# Patient Record
Sex: MALE | Race: WHITE | Employment: OTHER | URBAN - METROPOLITAN AREA
[De-identification: names, ages, dates, MRNs, and addresses within clinical notes are randomized per-mention and may not be internally consistent; named-entity substitution may affect disease eponyms.]

---

## 2018-08-01 ENCOUNTER — TELEPHONE (OUTPATIENT)
Dept: ORTHOPEDIC SURGERY | Age: 53
End: 2018-08-01

## 2018-08-01 NOTE — TELEPHONE ENCOUNTER
GAVE TO CANELO ALFONSO TO SCAN IN [facility];  MEDICAL RECORDS FOR [employer] FROM 2/24/2011 TO PRESENT.

## 2018-08-01 NOTE — TELEPHONE ENCOUNTER
[facility]ED [facility] MEDICAL RECORDS FOR 2/24/11 TO PRESENT INTO [facility] FOR [employer]

## 2020-11-12 RX ORDER — GABAPENTIN 100 MG/1
100 CAPSULE ORAL 3 TIMES DAILY
COMMUNITY

## 2020-11-12 NOTE — PROGRESS NOTES
[address]  time_11/18/2020 1200___________        Surgery time_____1300_______    Take the following medications with a sip of water:    Do not eat or drink anything after 12:00 midnight prior to your surgery. This includes water chewing gum, mints and ice chips. You may brush your teeth and gargle the morning of your surgery, but do not swallow the water     Please see your family doctor/pediatrician for a history and physical and/or concerning medications. Bring any test results/reports from your physicians office. If you are under the care of a heart doctor or specialist doctor, please be aware that you may be asked to them for clearance    You may be asked to stop blood thinners such as Coumadin, Plavix, Fragmin, Lovenox, etc., or any anti-inflammatories such as:  Aspirin, Ibuprofen, Advil, Naproxen prior to your surgery. We also ask that you stop any OTC medications such as fish oil, vitamin E, glucosamine, garlic, Multivitamins, COQ 10, etc.    We ask that you do not smoke 24 hours prior to surgery  We ask that you do not  drink any alcoholic beverages 24 hours prior to surgery     You must make arrangements for a responsible adult to take you home after your surgery. For your safety you will not be allowed to leave alone or drive yourself home. Your surgery will be cancelled if you do not have a ride home. Also for your safety, it is strongly suggested that someone stay with you the first 24 hours after your surgery. A parent or legal guardian must accompany a child scheduled for surgery and plan to stay at the hospital until the child is discharged. Please do not bring other children with you. For your comfort, please wear simple loose fitting clothing to the hospital.  Please do not bring valuables.     Do not wear any make-up or nail polish on your fingers or toes      For your safety, please do not wear any jewelry or body piercing's on the day of surgery. All jewelry must be removed. If you have dentures, they will be removed before going to operating room. For your convenience, we will provide you with a container. If you wear contact lenses or glasses, they will be removed, please bring a case for them. If you have a living will and a durable power of  for healthcare, please bring in a copy. As part of our patient safety program to minimize surgical site infections, we ask you to do the following:    · Please notify your surgeon if you develop any illness between         now and the  day of your surgery. · This includes a cough, cold, fever, sore throat, nausea,         or vomiting, and diarrhea, etc.  ·  Please notify your surgeon if you experience dizziness, shortness         of breath or blurred vision between now and the time of your surgery. Do not shave your operative site 96 hours prior to surgery. For face and neck surgery, men may use an electric razor 48 hours   prior to surgery. You may shower the night before surgery or the morning of   your surgery with an antibacterial soap. You will need to bring a photo ID and insurance card    [facility] has an onsite pharmacy, would you like to utilize our pharmacy     If you will be staying overnight and use a C-pap machine, please bring   your C-pap to hospital     Our goal is to provide you with excellent care, therefore, visitors will be limited to two(2) in the room at a time so that we may focus on providing this care for you. Please contact pre-admission testing if you have any further questions. [facility] phone number:  [phone]  Please note these are generalized instructions for all surgical cases, you may be provided with more specific instructions according to your surgery.

## 2020-11-17 ENCOUNTER — ANESTHESIA EVENT (OUTPATIENT)
Dept: ENDOSCOPY | Age: 55
End: 2020-11-17
Payer: COMMERCIAL

## 2020-11-18 ENCOUNTER — ANESTHESIA (OUTPATIENT)
Dept: ENDOSCOPY | Age: 55
End: 2020-11-18
Payer: COMMERCIAL

## 2020-11-18 ENCOUNTER — HOSPITAL ENCOUNTER (OUTPATIENT)
Age: 55
Setting detail: OUTPATIENT SURGERY
Discharge: HOME | End: 2020-11-18
Attending: INTERNAL MEDICINE | Admitting: INTERNAL MEDICINE
Payer: COMMERCIAL

## 2020-11-18 VITALS
OXYGEN SATURATION: 98 % | HEART RATE: 86 BPM | RESPIRATION RATE: 16 BRPM | TEMPERATURE: 98.1 F | SYSTOLIC BLOOD PRESSURE: 134 MMHG | HEIGHT: 71 IN | WEIGHT: 243.13 LBS | BODY MASS INDEX: 34.04 KG/M2 | DIASTOLIC BLOOD PRESSURE: 92 MMHG

## 2020-11-18 VITALS — OXYGEN SATURATION: 97 % | DIASTOLIC BLOOD PRESSURE: 74 MMHG | SYSTOLIC BLOOD PRESSURE: 123 MMHG

## 2020-11-18 PROCEDURE — 2580000003 HC RX 258: Performed by: ANESTHESIOLOGY

## 2020-11-18 PROCEDURE — 3609010600 HC COLONOSCOPY POLYPECTOMY SNARE/COLD BIOPSY: Performed by: INTERNAL MEDICINE

## 2020-11-18 PROCEDURE — 2709999900 HC NON-CHARGEABLE SUPPLY: Performed by: INTERNAL MEDICINE

## 2020-11-18 PROCEDURE — 88305 TISSUE EXAM BY PATHOLOGIST: CPT

## 2020-11-18 PROCEDURE — 2500000003 HC RX 250 WO HCPCS: Performed by: NURSE ANESTHETIST, CERTIFIED REGISTERED

## 2020-11-18 PROCEDURE — 7100000010 HC PHASE II RECOVERY - FIRST 15 MIN: Performed by: INTERNAL MEDICINE

## 2020-11-18 PROCEDURE — 3700000000 HC ANESTHESIA ATTENDED CARE: Performed by: INTERNAL MEDICINE

## 2020-11-18 PROCEDURE — 3700000001 HC ADD 15 MINUTES (ANESTHESIA): Performed by: INTERNAL MEDICINE

## 2020-11-18 PROCEDURE — 6360000002 HC RX W HCPCS: Performed by: NURSE ANESTHETIST, CERTIFIED REGISTERED

## 2020-11-18 PROCEDURE — 7100000011 HC PHASE II RECOVERY - ADDTL 15 MIN: Performed by: INTERNAL MEDICINE

## 2020-11-18 RX ORDER — SODIUM CHLORIDE 0.9 % (FLUSH) 0.9 %
10 SYRINGE (ML) INJECTION PRN
Status: DISCONTINUED | OUTPATIENT
Start: 2020-11-18 | End: 2020-11-18 | Stop reason: HOSPADM

## 2020-11-18 RX ORDER — SODIUM CHLORIDE 9 MG/ML
INJECTION, SOLUTION INTRAVENOUS CONTINUOUS
Status: DISCONTINUED | OUTPATIENT
Start: 2020-11-18 | End: 2020-11-18 | Stop reason: HOSPADM

## 2020-11-18 RX ORDER — SODIUM CHLORIDE 0.9 % (FLUSH) 0.9 %
10 SYRINGE (ML) INJECTION EVERY 12 HOURS SCHEDULED
Status: DISCONTINUED | OUTPATIENT
Start: 2020-11-18 | End: 2020-11-18 | Stop reason: HOSPADM

## 2020-11-18 RX ORDER — LIDOCAINE HYDROCHLORIDE 20 MG/ML
INJECTION, SOLUTION EPIDURAL; INFILTRATION; INTRACAUDAL; PERINEURAL PRN
Status: DISCONTINUED | OUTPATIENT
Start: 2020-11-18 | End: 2020-11-18 | Stop reason: SDUPTHER

## 2020-11-18 RX ORDER — PROPOFOL 10 MG/ML
INJECTION, EMULSION INTRAVENOUS PRN
Status: DISCONTINUED | OUTPATIENT
Start: 2020-11-18 | End: 2020-11-18 | Stop reason: SDUPTHER

## 2020-11-18 RX ADMIN — LIDOCAINE HYDROCHLORIDE 80 MG: 20 INJECTION, SOLUTION EPIDURAL; INFILTRATION; INTRACAUDAL; PERINEURAL at 13:14

## 2020-11-18 RX ADMIN — PROPOFOL 50 MG: 10 INJECTION, EMULSION INTRAVENOUS at 13:21

## 2020-11-18 RX ADMIN — SODIUM CHLORIDE: 9 INJECTION, SOLUTION INTRAVENOUS at 12:48

## 2020-11-18 RX ADMIN — PROPOFOL 100 MG: 10 INJECTION, EMULSION INTRAVENOUS at 13:16

## 2020-11-18 RX ADMIN — PROPOFOL 100 MG: 10 INJECTION, EMULSION INTRAVENOUS at 13:14

## 2020-11-18 ASSESSMENT — PAIN - FUNCTIONAL ASSESSMENT: PAIN_FUNCTIONAL_ASSESSMENT: 0-10

## 2020-11-18 ASSESSMENT — PAIN SCALES - GENERAL
PAINLEVEL_OUTOF10: 0

## 2020-11-18 ASSESSMENT — ENCOUNTER SYMPTOMS: SHORTNESS OF BREATH: 0

## 2020-11-18 NOTE — ANESTHESIA POSTPROCEDURE EVALUATION
Department of Anesthesiology  Postprocedure Note    Patient: Rama Lan  MRN: [id number]  YOB: 1965  Date of evaluation: 11/18/2020  Time:  1:49 PM     Procedure Summary     Date:  11/18/20 Room / Location:  [address] [place] [address]    Anesthesia Start:  3153 Anesthesia Stop:  9797    Procedure:  COLONOSCOPY POLYPECTOMY SNARE/COLD BIOPSY (N/A ) Diagnosis:       Family history of polyps in the colon      (FAMILY HISTORY OF POLYPS (MOM))    Surgeon:  Farrukh Haider MD Responsible Provider:  Rickey Ghosh MD    Anesthesia Type:  MAC ASA Status:  2          Anesthesia Type: MAC    Julianne Phase I: Julianne Score: 10    Julianne Phase II: Julianne Score: 9    Last vitals: Reviewed and per EMR flowsheets.        Anesthesia Post Evaluation    Patient location during evaluation: PACU  Level of consciousness: awake and alert  Airway patency: patent  Nausea & Vomiting: no nausea and no vomiting  Complications: no  Cardiovascular status: blood pressure returned to baseline  Respiratory status: acceptable  Hydration status: euvolemic  Comments: Postoperative Anesthesia Note    Name:    Rama Lan  MRN:      [id number]    Patient Vitals in the past 12 hrs:  11/18/20 1345, BP:(!) 134/92, Pulse:86, Resp:16, SpO2:98 %  11/18/20 1337, BP:(!) 92/46, Pulse:82, Resp:16, SpO2:97 %  11/18/20 1327, BP:(!) 116/58, Temp:98.1 °F (36.7 °C), Temp src:Temporal, Pulse:88, Resp:16, SpO2:97 %  11/18/20 1222, BP:(!) 159/96, Temp:98 °F (36.7 °C), Temp src:Temporal, Pulse:102, Resp:18, SpO2:97 %, Height:5' 11\" (1.803 m), Weight:243 lb 2 oz (110.3 kg)     LABS:    CBC  Lab Results       Component                Value               Date/Time                  WBC                      5.8                 08/18/2015 11:45 AM        HGB                      12.1 (L)            08/18/2015 11:45 AM        HCT                      38.6 (L)            08/18/2015 11:45 AM        PLT                      258 08/18/2015 11:45 AM   RENAL  Lab Results       Component                Value               Date/Time                  NA                       141                 07/09/2013 03:40 PM        K                        4.0                 07/09/2013 03:40 PM        CL                       102                 07/09/2013 03:40 PM        CO2                      29                  07/09/2013 03:40 PM        BUN                      17                  07/09/2013 03:40 PM        CREATININE               1.1                 07/09/2013 03:40 PM        GLUCOSE                  97                  07/09/2013 03:40 PM   COAGS  No results found for: PROTIME, INR, APTT    Intake & Output:  @24HRIO@    Nausea & Vomiting:  No    Level of Consciousness:  Awake    Pain Assessment:  Adequate analgesia    Anesthesia Complications:  No apparent anesthetic complications    SUMMARY      Vital signs stable  OK to discharge from Stage I post anesthesia care.   Care transferred from Anesthesiology department on discharge from perioperative area

## 2020-11-18 NOTE — ANESTHESIA PRE PROCEDURE
[facility] Department of Anesthesiology  Pre-Anesthesia Evaluation/Consultation       Name:  Landon Reich  : 1965  Age:  50 y.o. MRN:  [id number]  Date: 2020           Surgeon: Surgeon(s):  Keyonna Winn MD    Procedure: Procedure(s):  COLONOSCOPY DIAGNOSTIC     Allergies   Allergen Reactions    Reglan [Metoclopramide Hcl]      There is no problem list on file for this patient. Past Medical History:   Diagnosis Date    Hyperlipidemia     Hypertension      Past Surgical History:   Procedure Laterality Date    ANTERIOR CRUCIATE LIGAMENT REPAIR      right knee    BACK SURGERY      CERVICAL SPINE SURGERY      COLONOSCOPY       Social History     Tobacco Use    Smoking status: Never Smoker    Smokeless tobacco: Never Used   Substance Use Topics    Alcohol use: Not Currently    Drug use: Never     Medications  No current facility-administered medications on file prior to encounter. Current Outpatient Medications on File Prior to Encounter   Medication Sig Dispense Refill    gabapentin (NEURONTIN) 100 MG capsule Take 100 mg by mouth 3 times daily.  ibuprofen (ADVIL;MOTRIN) 200 MG tablet Take 200 mg by mouth every 6 hours as needed.  tramadol (ULTRAM) 50 MG tablet Take 50 mg by mouth every 8 hours as needed.  omeprazole (PRILOSEC) 20 MG capsule Take 20 mg by mouth daily.  simvastatin (ZOCOR) 10 MG tablet Take 10 mg by mouth nightly.  buPROPion (WELLBUTRIN SR) 200 MG SR tablet Take 200 mg by mouth 2 times daily.  lisinopril (PRINIVIL;ZESTRIL) 10 MG tablet Take 10 mg by mouth daily.        Current Facility-Administered Medications   Medication Dose Route Frequency Provider Last Rate Last Dose    0.9 % sodium chloride infusion   Intravenous Continuous Bhakti Em MD        sodium chloride flush 0.9 % injection 10 mL  10 mL Intravenous 2 times per day Bhakti Em MD        sodium chloride flush 07/09/2013     07/09/2013    CO2 29 07/09/2013    BUN 17 07/09/2013    CREATININE 1.1 07/09/2013    CALCIUM 9.7 07/09/2013    GFRAA >60 07/09/2013    LABGLOM >60 07/09/2013    GLUCOSE 97 07/09/2013     POCGlucose  No results for input(s): GLUCOSE in the last 72 hours. Coags  No results found for: PROTIME, INR, APTT  HCG (If Applicable) No results found for: PREGTESTUR, PREGSERUM, HCG, HCGQUANT   ABGs No results found for: PHART, PO2ART, GRY1ISR, NNG0RLH, BEART, Q1NADMTJ   Type & Screen (If Applicable)  No results found for: LABABO, LABRH                         BMI: Wt Readings from Last 3 Encounters:       NPO Status:   Date of last liquid consumption: 11/18/20   Time of last liquid consumption: 0900   Date of last solid food consumption: 11/16/20      Time of last solid consumption: 2100       Anesthesia Evaluation  Patient summary reviewed  Airway: Mallampati: II  TM distance: >3 FB   Neck ROM: full  Mouth opening: > = 3 FB Dental: normal exam         Pulmonary:       (-) COPD, asthma and shortness of breath                           Cardiovascular:    (+) hypertension:, hyperlipidemia    (-) valvular problems/murmurs, past MI, CAD, CABG/stent, dysrhythmias and  angina                Neuro/Psych:      (-) seizures, TIA and CVA           GI/Hepatic/Renal:        (-) GERD, PUD, liver disease and no renal disease       Endo/Other:        (-) diabetes mellitus               Abdominal:           Vascular: negative vascular ROS. Anesthesia Plan      MAC     ASA 2     (I discussed intravenous sedation to the patient's satisfaction including risks and alternatives. The patient agreed with the plan and has no further questions. YASMEEN SESAY )  Induction: intravenous. Anesthetic plan and risks discussed with patient. Plan discussed with CRNA.                   This pre-anesthesia assessment may be used as a history and physical.    DOS STAFF ADDENDUM:    Pt seen and examined, chart reviewed (including anesthesia, drug and allergy history). No interval changes to history and physical examination. Anesthetic plan, risks, benefits, alternatives, and personnel involved discussed with patient. Patient verbalized an understanding and agrees to proceed.       Swati Liriano MD  November 18, 2020  12:37 PM

## 2020-11-18 NOTE — H&P
[facility]   Pre-operative History and Physical    Patient: Amanda Jenkins  : 1965  Acct#: [de-identified]    History Obtained From: electronic medical record    HISTORY OF PRESENT ILLNESS  Procedure:Colonoscopy  Indications:surveillance  Past Medical History:        Diagnosis Date    Hyperlipidemia     Hypertension      Past Surgical History:        Procedure Laterality Date    ANTERIOR CRUCIATE LIGAMENT REPAIR      right knee    BACK SURGERY      CERVICAL SPINE SURGERY      COLONOSCOPY       Medications prior to admission:   Prior to Admission medications    Medication Sig Start Date End Date Taking? Authorizing Provider   gabapentin (NEURONTIN) 100 MG capsule Take 100 mg by mouth 3 times daily. Yes Historical Provider, MD   ibuprofen (ADVIL;MOTRIN) 200 MG tablet Take 200 mg by mouth every 6 hours as needed. Yes Historical Provider, MD   tramadol (ULTRAM) 50 MG tablet Take 50 mg by mouth every 8 hours as needed. 11  Yes Historical Provider, MD   omeprazole (PRILOSEC) 20 MG capsule Take 20 mg by mouth daily. Yes Historical Provider, MD   simvastatin (ZOCOR) 10 MG tablet Take 10 mg by mouth nightly. Yes Historical Provider, MD   buPROPion (WELLBUTRIN SR) 200 MG SR tablet Take 200 mg by mouth 2 times daily. Yes Historical Provider, MD   lisinopril (PRINIVIL;ZESTRIL) 10 MG tablet Take 10 mg by mouth daily.    Yes Historical Provider, MD     Allergies:   Reglan [metoclopramide hcl]    Social History     Socioeconomic History    Marital status:      Spouse name: Not on file    Number of children: Not on file    Years of education: Not on file    Highest education level: Not on file   Occupational History    Not on file   Social Needs    Financial resource strain: Not on file    Food insecurity     Worry: Not on file     Inability: Not on file    Transportation needs     Medical: Not on file     Non-medical: Not on file   Tobacco Use    Smoking status: Never Smoker    Smokeless tobacco: Never Used   Substance and Sexual Activity    Alcohol use: Not Currently    Drug use: Never    Sexual activity: Not Currently   Lifestyle    Physical activity     Days per week: Not on file     Minutes per session: Not on file    Stress: Not on file   Relationships    Social connections     Talks on phone: Not on file     Gets together: Not on file     Attends Rastafarian service: Not on file     Active member of club or organization: Not on file     Attends meetings of clubs or organizations: Not on file     Relationship status: Not on file    Intimate partner violence     Fear of current or ex partner: Not on file     Emotionally abused: Not on file     Physically abused: Not on file     Forced sexual activity: Not on file   Other Topics Concern    Not on file   Social History Narrative    Not on file     Family History   Problem Relation Age of Onset    Cancer Father     Prostate Cancer Father          PHYSICAL EXAM:      BP (!) 159/96   Pulse 102   Temp 98 °F (36.7 °C) (Temporal)   Resp 18   Ht 5' 11\" (1.803 m)   Wt 243 lb 2 oz (110.3 kg)   SpO2 97%   BMI 33.91 kg/m²  I        Heart:normal    Lungs: normal    Abdomen: normal      ASA Grade:  See anesthesia note      ASSESSMENT AND PLAN:    1. Procedure options, risks and benefits reviewed with patient and expresses understanding.

## 2020-11-18 NOTE — PROCEDURES
[facility]  Endoscopy Note    Patient: Lou Weaver  : 1965  Acct#: [de-identified]    Procedure: Colonoscopy with biopsy    Date:  2020    Surgeon:  Anastacia Hayes MD    Referring Physician:  Clementina Ambrocio    Previous Colonoscopy: Yes  Date: unknown  Greater than 3 years? Yes    Preoperative Diagnosis:  surveillance    Postoperative Diagnosis:  Colon polyp    Anesthesia:  See anesthesia note    Indications: This is a 50y.o. year old male who presents today with previous adenomatous polyp. Procedure: An informed consent was obtained from the patient after explanation of indications, benefits, possible risks and complications of the procedure. The patient was then taken to the endoscopy suite, placed in the left lateral decubitus position, and the above IV anesthesia was administered. A digital rectal examination was performed and revealed negative without mass, lesions or tenderness. The Olympus CFQ-180-AL video colonoscope was placed in the patient's rectum under digital direction and advanced to the cecum. The cecum was identified by characteristic anatomy and ballottment. The ileocecal valve was identified. The preparation was fair. The scope was then withdrawn back through the cecum, ascending, transverse, descending and sigmoid colons. Carefull circumferential examination of the mucosa in these areas demonstrated a 3 mm polyp in the ascending colon that was biopsied and removed. There are large internal hemorrhoids. The scope was then withdrawn into the rectum and retroflexed. The retroflexed view of the anal verge and rectum demonstrates no abnormalities. The scope was straightened, the colon was decompressed and the scope was withdrawn from the patient. The patient tolerated the procedure well and was taken to the PACU in good condition. Estimated Blood Loss:  none    Impression:  Colon polyp and internal hemorrhoids    Recommendations:  Await pathology.   Repeat colonoscopy in 5 years.     Fanny Cuenca MD   [facility]  11/18/2020

## (undated) DEVICE — FORCEPS BX 240CM 2.4MM L NDL RAD JAW 4 M00513334